# Patient Record
Sex: FEMALE
[De-identification: names, ages, dates, MRNs, and addresses within clinical notes are randomized per-mention and may not be internally consistent; named-entity substitution may affect disease eponyms.]

---

## 2019-01-14 PROBLEM — Z00.00 ENCOUNTER FOR PREVENTIVE HEALTH EXAMINATION: Status: ACTIVE | Noted: 2019-01-14

## 2019-01-23 ENCOUNTER — APPOINTMENT (OUTPATIENT)
Dept: BREAST CENTER | Facility: CLINIC | Age: 44
End: 2019-01-23
Payer: COMMERCIAL

## 2019-01-23 VITALS
BODY MASS INDEX: 22.99 KG/M2 | DIASTOLIC BLOOD PRESSURE: 72 MMHG | WEIGHT: 138 LBS | SYSTOLIC BLOOD PRESSURE: 104 MMHG | TEMPERATURE: 97.8 F | HEIGHT: 65 IN

## 2019-01-23 DIAGNOSIS — Z80.41 FAMILY HISTORY OF MALIGNANT NEOPLASM OF OVARY: ICD-10-CM

## 2019-01-23 DIAGNOSIS — N64.52 NIPPLE DISCHARGE: ICD-10-CM

## 2019-01-23 PROCEDURE — 99203 OFFICE O/P NEW LOW 30 MIN: CPT

## 2019-01-24 PROBLEM — Z80.41 FAMILY HISTORY OF OVARIAN CANCER: Status: ACTIVE | Noted: 2019-01-24

## 2019-01-24 RX ORDER — IRON/IRON ASP GLY/FA/MV-MIN 38 125-25-1MG
TABLET ORAL
Refills: 0 | Status: ACTIVE | COMMUNITY

## 2019-02-08 NOTE — PHYSICAL EXAM
[Normocephalic] : normocephalic [Atraumatic] : atraumatic [EOMI] : extra ocular movement intact [No Supraclavicular Adenopathy] : no supraclavicular adenopathy [No Cervical Adenopathy] : no cervical adenopathy [Examined in the supine and seated position] : examined in the supine and seated position [No dominant masses] : no dominant masses in right breast  [No dominant masses] : no dominant masses left breast [No Nipple Retraction] : no left nipple retraction [No Nipple Discharge] : no left nipple discharge [No Axillary Lymphadenopathy] : no left axillary lymphadenopathy [Soft] : abdomen soft [Not Tender] : non-tender [No Edema] : no edema [No Rashes] : no rashes [No Ulceration] : no ulceration [de-identified] : serous nipple discharge elicited from a central duct, but no bloody nipple discharge, and no suspicious masses were palpated within the breast  [de-identified] : nodularity in the 3:00, 2-3 cm FN, may be her previously biopsy proven fibroadenoma, no other suspicious masses palpated within the breast

## 2019-02-08 NOTE — HISTORY OF PRESENT ILLNESS
[FreeTextEntry1] : Braden is a 43 F who presents with a 1 month history of right bloody nipple discharge. \par \par She reports that she only gets the discharge when she squeezes her nipple.  15 years prior she had a similar episode on the left breast that resolved on its own.  She also occasional feels her left breast fibroadenoma, and has intermittent lateral left breast pain. She has not palpated any new breast masses and denies any other skin changes or nipple retraction. \par \par Her most recent imaging was a b/l dx mammogram and US on 19 which was unrevealing for any suspicious lesions in either breast. \par \par HISTORICAL RISK FACTORS: \par -1 prior L breast biopsy yielding fibroadenoma \par -no prior breast surgeries \par -family history of ovarian cancer in a maternal aunt, dx at age 60 \par -, age at first live birth was 21 \par -prior OCP use x 5 years, stopped 7 months prior

## 2019-02-08 NOTE — ASSESSMENT
[FreeTextEntry1] : Braden is a 43 perimenoapusal F with right bloody nipple discharge, left breast pain and a left breast fibroadenoma. \par \par She has no suspicious masses palpated on physical exam, but did have serious nipple discharge from a central duct in her right breast.  HEr most recent imaging on 1/7/19 was a b/l mammogram and US which was unrevealing for any suspicious lesions in either breast.  \par \par We discussed bloody nipple discharge.  The most common reason for this is either trauma to the breast or an intraductal papilloma.  However her mammogram and US did not reveal any suspicious masses.  For this reason, I would like to obtain a breast MRI to rule out any underlying cancers causing her discharge.  This will be scheduled for her.  \par \par We discussed dense breasts.  Increasing breast density has been found to increase ones risk of breast cancer, but at this time, there is no clear indication for additional imaging in this setting, as both US and MRI have not been found to improve survival.  One can consider bilateral screening US.  However, out of 1000 women screened, the use of routine US will only identify an additional 3-5 cancers.  The use of US was found to increase the likelihood of undergoing more imaging and more biopsies.  She does have dense breasts.  We have decided to proceed with screening bilateral breast US at this time.  This will be scheduled with her next screening mammogram.\par \par In regards to her family history of two maternal aunts with ovarian cancer, she qualifies for genetic testing.  This was discussed with the patient and she will let us know if she decides to proceed with testing.  Otherwise she is at average risk for breast cancer and should continue with yearly screening mammograms and US. \par \par We discussed fibroadenomas.  These are benign lesions without any malignant potential.  They are hormonally influenced and can increase or decrease in size and can also regress spontaneously.  They are considered proliferative lesions without atypia.  Patients with these lesions have been found to have a slightly increased relative risk of breast cancer compared to the reference population.  Surgical excision is recommended when the diagnosis in unclear, the lesion is causing pain or breast deformity, or if it is rapidly enlarging. Her fibroadenoma is not symptomatic at this time, so we will continue with observation. \par \par In regards to her breast pain, it may be related to fibrocystic changes within her breast that are hormonally influenced. We spoke about possible interventions including evening primrose oil, supportive bras, and decreasing caffeine intake.  Although none of these have been consistently proven to improve breast pain, they may be tried.  If the pain becomes very severe, there have been studies of tamoxifen being effective for the treatment of breast pain, although there are risks with tamoxifen.  At this time she will try supportive measures.\par \par All of her questions were answered.  SHe knows to call with any further questions or concerns. \par \par PLAN:\par -breast MRI \par -genetic testing \par -f/up in 1 month to discuss results and re-evaluate her symptomatology

## 2019-02-08 NOTE — PAST MEDICAL HISTORY
[Perimenopausal] : The patient is perimenopausal [Menarche Age ____] : age at menarche was [unfilled] [Definite ___ (Date)] : the last menstrual period was [unfilled] [Total Preg ___] : G[unfilled] [Live Births ___] : P[unfilled]  [Age At Live Birth ___] : Age at live birth: [unfilled] [History of Hormone Replacement Treatment] : has no history of hormone replacement treatment [FreeTextEntry5] : denies  [FreeTextEntry6] : denies [FreeTextEntry7] : yes x 1 5 years in past, stopped 7 months prior  [FreeTextEntry8] : yes x 1 year

## 2019-02-08 NOTE — REVIEW OF SYSTEMS
[Breast Pain] : breast pain [Breast Lump] : breast lump [As Noted in HPI] : as noted in HPI [Hot Flashes] : hot flashes [Negative] : Heme/Lymph [Abn Vaginal Bleeding] : no unexplained vaginal bleeding [Skin Lesions] : no skin lesions [Skin Wound] : no skin wound [FreeTextEntry8] : has not had her menses in three months  [de-identified] : right bloody nipple discharge

## 2019-02-08 NOTE — DATA REVIEWED
[FreeTextEntry1] : 1/7/19 -- b/l dx tomosynthesis \par -heterogenously dense breasts \par -no signficant masses, calcifications, or other findings are seen in either breast \par BIRADS 1\par \par 1/7/19 -- b/l breast US \par -no cystic or solid lesions bilaterally

## 2019-02-08 NOTE — CONSULT LETTER
[Dear  ___] : Dear  [unfilled], [Consult Letter:] : I had the pleasure of evaluating your patient, [unfilled]. [Please see my note below.] : Please see my note below. [Consult Closing:] : Thank you very much for allowing me to participate in the care of this patient.  If you have any questions, please do not hesitate to contact me. [Sincerely,] : Sincerely, [FreeTextEntry2] : Stacey Livingston MD\par [address] [place] [address] [address] \par [place] [id number] [FreeTextEntry3] : Nimo Esparza MD \par Breast Surgical Oncologist\par [facility] [place]\par [facility]\par [facility]

## 2019-04-08 ENCOUNTER — APPOINTMENT (OUTPATIENT)
Dept: BREAST CENTER | Facility: CLINIC | Age: 44
End: 2019-04-08
Payer: COMMERCIAL

## 2019-04-08 VITALS
WEIGHT: 138 LBS | HEIGHT: 65 IN | BODY MASS INDEX: 22.99 KG/M2 | SYSTOLIC BLOOD PRESSURE: 108 MMHG | TEMPERATURE: 98.5 F | DIASTOLIC BLOOD PRESSURE: 68 MMHG

## 2019-04-08 DIAGNOSIS — D24.2 BENIGN NEOPLASM OF LEFT BREAST: ICD-10-CM

## 2019-04-08 DIAGNOSIS — R92.2 INCONCLUSIVE MAMMOGRAM: ICD-10-CM

## 2019-04-08 PROCEDURE — 99213 OFFICE O/P EST LOW 20 MIN: CPT

## 2019-04-08 NOTE — HISTORY OF PRESENT ILLNESS
[FreeTextEntry1] : Braden is a 43 F who presents with a 1 month history of right bloody nipple discharge. \par \par She reports that she only gets the discharge when she squeezes her nipple.  15 years prior she had a similar episode on the left breast that resolved on its own.  She also occasional feels her left breast fibroadenoma, and has intermittent lateral left breast pain. She has not palpated any new breast masses and denies any other skin changes or nipple retraction. \par \par Her most recent imaging was a b/l dx mammogram and US on 19 which was unrevealing for any suspicious lesions in either breast. \par \par She had a b/l breast MRI on 19 for bloody nipple discharge which did not reveal any suspicious lesions in either breast. \par \par She had [facility] genetic testing on 19 which did not identify any mutations.\par \par HISTORICAL RISK FACTORS: \par -1 prior L breast biopsy yielding fibroadenoma \par -no prior breast surgeries \par -family history of ovarian cancer in a maternal aunt, dx at age 60 \par -, age at first live birth was 21 \par -prior OCP use x 5 years, stopped 7 months prior \par \par INTERVAL HISTORY: \par Braden returns for a short term follow up.  She initially presented with right bloody nipple discharge.  She had a workup consisting of a breast MRI which did not reveal any suspicious abnormalities in either breast.  She has since stopped having nipple discharge from either nipple.  \par \par She has started to have left breast pain located in the UOQ/axilla and radiating down.  She has not palpated any new breast masses in either breast, denies any overlying skin changes and has no other breast related complaints.

## 2019-04-08 NOTE — REVIEW OF SYSTEMS
[Breast Pain] : breast pain [Breast Lump] : breast lump [As Noted in HPI] : as noted in HPI [Hot Flashes] : hot flashes [Negative] : Heme/Lymph [Fever] : no fever [Chills] : no chills [Abn Vaginal Bleeding] : no unexplained vaginal bleeding [Skin Lesions] : no skin lesions [Skin Wound] : no skin wound [FreeTextEntry8] : has not had her menses in three months

## 2019-04-08 NOTE — PHYSICAL EXAM
[Normocephalic] : normocephalic [Atraumatic] : atraumatic [EOMI] : extra ocular movement intact [No Supraclavicular Adenopathy] : no supraclavicular adenopathy [No Cervical Adenopathy] : no cervical adenopathy [Examined in the supine and seated position] : examined in the supine and seated position [No dominant masses] : no dominant masses in right breast  [No dominant masses] : no dominant masses left breast [No Nipple Retraction] : no left nipple retraction [No Axillary Lymphadenopathy] : no left axillary lymphadenopathy [Soft] : abdomen soft [Not Tender] : non-tender [No Edema] : no edema [No Rashes] : no rashes [No Ulceration] : no ulceration [No Nipple Discharge] : no right nipple discharge [de-identified] : no nipple discharge elicited, no suspicious masses were palpated within the breast  [de-identified] : nodularity in the 3:00, 2-3 cm FN, may be her previously biopsy proven fibroadenoma, about 1 cm in size, no other suspicious masses palpated within the breast; no overlying skin changes

## 2019-04-08 NOTE — DATA REVIEWED
[FreeTextEntry1] : 1/7/19 -- b/l dx tomosynthesis \par -heterogenously dense breasts \par -no signficant masses, calcifications, or other findings are seen in either breast \par BIRADS 1\par \par 1/7/19 -- b/l breast US \par -no cystic or solid lesions bilaterally \par \par IMPRESSION:\par \par No MRI evidence of malignancy. Further management of right nipple bloody nipple discharge can be based on clinical grounds. Consultation with a breast surgeon is advised.\par \par \par BIRADS: Category 2: Benign\par \par BILATERAL BREAST MRI WITH GADOLINIUM\par \par Clinical indication: Right breast bloody nipple discharge with a negative mammogram and ultrasound\par \par Technique: Using a dedicated breast coil on a 1.5 Corin magnet with the patient in the prone position, both breasts were imaged with a fat suppressed axial T2 weighted sequence, an axial T1 weighted 3-D volumetric scan, and an axial 3-D volumetric fat suppressed T1 weighted scan which was obtained before and three times after the injection of 12 cc of intravenous gadolinium chelate (ProHance)). The axial images were acquired using 1.1 mm thick sections. Finally, a high resolution sagittal post-contrast scan was obtained.\par In addition, complex 3-Dimensional rendering including shaded-surface rendering, volumetric rendering, quantitative analysis (segmental volumes and surgical planning), maximum intensity projections, and kinetic analysis of enhancing lesions were performed on an independent [employer] workstation.\par \par Comparisons: None. Reference is made to prior mammogram of 1/7/2019 to prior ultrasound of 2018 and 2019.\par \par Findings:\par The background parenchymal enhancement is mild to moderate. The breast parenchymal pattern is heterogeneously dense.\par \par RIGHT:\par No suspicious enhancing masses or areas of ductal enhancement are seen in the right breast.\par \par LEFT:\par In the central left breast, best seen on image 96 is a 1.5 cm oval area demonstrating benign type kinetics. This correlates with the previously biopsied benign fibroadenoma at 1:00 axis at 3 cm from nipple. No suspicious enhancing masses or areas of ductal enhancement are seen in the left breast.\par \par AXILLA and ANCILLARY FINDINGS:\par There is no abnormal axillary or internal mammary adenopathy.\par \par Electronic Signature: I personally reviewed the images and agree with this report. Final Report: Dictated by and Signed by Attending Emily Wells MD 2/13/2019 4:00 PM\par \par 2/4/19 -- [facility] genetic testing \par -no mutations identified \par \par 
no indicators present

## 2019-04-08 NOTE — ASSESSMENT
[FreeTextEntry1] : Braden is a 43 perimenoapusal F with resolved right bloody nipple discharge, left breast pain and a left breast fibroadenoma. \par \par She has no suspicious masses palpated on physical exam, but her left breast fibroadenoma is likely palpable.  SHe is no longer having nipple discharge.  She had a b/l breast MRI on 2/7/19 which was unrevealing for any suspicious enhancement and COLOR genetic testing which did not reveal any mutations.  She will be due for a yearly screening mammogram and US on 1/17/2020.  This will be scheduled for her today. I will have her follow up after repeat imaging. \par \par We discussed dense breasts.  Increasing breast density has been found to increase ones risk of breast cancer, but at this time, there is no clear indication for additional imaging in this setting, as both US and MRI have not been found to improve survival.  One can consider bilateral screening US.  However, out of 1000 women screened, the use of routine US will only identify an additional 3-5 cancers.  The use of US was found to increase the likelihood of undergoing more imaging and more biopsies.  She does have dense breasts.  We have decided to proceed with screening bilateral breast US at this time.  This will be scheduled with her next screening mammogram.\par \par In regards to her family history of two maternal aunts with ovarian cancer, she qualifies for genetic testing.  She had COLOR genetic testing performed which did not identify any mutations.  She is otherwise at an average risk for developing breast cancer. \par \par We discussed fibroadenomas.  These are benign lesions without any malignant potential.  They are hormonally influenced and can increase or decrease in size and can also regress spontaneously.  They are considered proliferative lesions without atypia.  Patients with these lesions have been found to have a slightly increased relative risk of breast cancer compared to the reference population.  Surgical excision is recommended when the diagnosis in unclear, the lesion is causing pain or breast deformity, or if it is rapidly enlarging. Her fibroadenoma is not symptomatic at this time, so we will continue with observation. \par \par In regards to her breast pain, it may be related to fibrocystic changes within her breast that are hormonally influenced. We spoke about possible interventions including evening primrose oil, supportive bras, and decreasing caffeine intake.  Although none of these have been consistently proven to improve breast pain, they may be tried.  If the pain becomes very severe, there have been studies of tamoxifen being effective for the treatment of breast pain, although there are risks with tamoxifen.  At this time she will try supportive measures.\par \par All of her questions were answered.  SHe knows to call with any further questions or concerns. \par \par PLAN:\par -b/l mammogram and US on 1/17/2020\par -f/up after

## 2019-12-30 DIAGNOSIS — N64.4 MASTODYNIA: ICD-10-CM

## 2020-03-09 ENCOUNTER — APPOINTMENT (OUTPATIENT)
Dept: BREAST CENTER | Facility: CLINIC | Age: 45
End: 2020-03-09

## 2022-09-21 ENCOUNTER — APPOINTMENT (OUTPATIENT)
Dept: ORTHOPEDIC SURGERY | Facility: CLINIC | Age: 47
End: 2022-09-21

## 2022-09-21 DIAGNOSIS — M17.10 UNILATERAL PRIMARY OSTEOARTHRITIS, UNSPECIFIED KNEE: ICD-10-CM

## 2022-09-21 PROCEDURE — 99214 OFFICE O/P EST MOD 30 MIN: CPT

## 2022-09-21 PROCEDURE — 73562 X-RAY EXAM OF KNEE 3: CPT | Mod: 50

## 2022-09-21 NOTE — HISTORY OF PRESENT ILLNESS
[de-identified] : Patient is here for her left knee she complains of anterior clicking been going on for years acute exacerbation of a chronic problem\par \par  x-rays reviewed from prior visit show patellofemoral spurring some spurring along the medial femoral\par  c\par ondyle\par \par  physical exam showing some patellofemoral crepitus nontender over the mediolateral joint lines no effusion good range of motion scar along any from a accident as a child well-healed\par \par  recommend anti-inflammatories side effects were discussed if she needs more she will call her medical doctor I did offer her an injection she had once in the past did not want 1 today will see her back as needed

## 2022-10-21 ENCOUNTER — RX RENEWAL (OUTPATIENT)
Age: 47
End: 2022-10-21

## 2022-10-21 RX ORDER — MELOXICAM 15 MG/1
15 TABLET ORAL
Qty: 30 | Refills: 0 | Status: ACTIVE | COMMUNITY
Start: 2022-09-21 | End: 1900-01-01

## 2025-06-10 ENCOUNTER — APPOINTMENT (OUTPATIENT)
Dept: ORTHOPEDIC SURGERY | Facility: CLINIC | Age: 50
End: 2025-06-10
Payer: COMMERCIAL

## 2025-06-10 PROBLEM — M54.50 CHRONIC MIDLINE LOW BACK PAIN WITHOUT SCIATICA: Status: ACTIVE | Noted: 2025-06-10

## 2025-06-10 PROCEDURE — 72100 X-RAY EXAM L-S SPINE 2/3 VWS: CPT

## 2025-06-10 PROCEDURE — 73502 X-RAY EXAM HIP UNI 2-3 VIEWS: CPT

## 2025-06-10 PROCEDURE — 73560 X-RAY EXAM OF KNEE 1 OR 2: CPT | Mod: 50

## 2025-06-10 PROCEDURE — 99203 OFFICE O/P NEW LOW 30 MIN: CPT

## 2025-06-30 ENCOUNTER — APPOINTMENT (OUTPATIENT)
Dept: ORTHOPEDIC SURGERY | Facility: CLINIC | Age: 50
End: 2025-06-30

## 2025-08-25 ENCOUNTER — APPOINTMENT (OUTPATIENT)
Dept: ORTHOPEDIC SURGERY | Facility: CLINIC | Age: 50
End: 2025-08-25
Payer: COMMERCIAL

## 2025-08-25 DIAGNOSIS — M51.369: ICD-10-CM

## 2025-08-25 DIAGNOSIS — G89.29 LOW BACK PAIN, UNSPECIFIED: ICD-10-CM

## 2025-08-25 DIAGNOSIS — M54.50 LOW BACK PAIN, UNSPECIFIED: ICD-10-CM

## 2025-08-25 PROCEDURE — 99214 OFFICE O/P EST MOD 30 MIN: CPT
